# Patient Record
Sex: FEMALE | Race: ASIAN | NOT HISPANIC OR LATINO | URBAN - METROPOLITAN AREA
[De-identification: names, ages, dates, MRNs, and addresses within clinical notes are randomized per-mention and may not be internally consistent; named-entity substitution may affect disease eponyms.]

---

## 2024-10-25 ENCOUNTER — EMERGENCY (EMERGENCY)
Facility: HOSPITAL | Age: 39
LOS: 1 days | Discharge: HOME | End: 2024-10-25
Admitting: EMERGENCY MEDICINE
Payer: COMMERCIAL

## 2024-10-25 VITALS
WEIGHT: 130.07 LBS | HEART RATE: 60 BPM | HEIGHT: 64 IN | OXYGEN SATURATION: 98 % | TEMPERATURE: 99 F | SYSTOLIC BLOOD PRESSURE: 111 MMHG | DIASTOLIC BLOOD PRESSURE: 74 MMHG | RESPIRATION RATE: 18 BRPM

## 2024-10-25 PROCEDURE — 12011 RPR F/E/E/N/L/M 2.5 CM/<: CPT

## 2024-10-25 PROCEDURE — 99284 EMERGENCY DEPT VISIT MOD MDM: CPT | Mod: 25

## 2024-10-25 PROCEDURE — 72125 CT NECK SPINE W/O DYE: CPT | Mod: 26,MC

## 2024-10-25 PROCEDURE — 70450 CT HEAD/BRAIN W/O DYE: CPT | Mod: 26,MC

## 2024-10-25 PROCEDURE — 70486 CT MAXILLOFACIAL W/O DYE: CPT | Mod: 26,MC

## 2024-10-25 NOTE — ED PROVIDER NOTE - NSICDXNOFAMILYHX_GEN_ALL_ED
Family at bedside  Pt had another episode of green watery stool, large amount, covered legs down to mid calf  Pt cleaned up and placed into clean gown       Rudi Norris RN  03/24/21 2012 <-- Click to add NO pertinent Family History
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
## 2024-10-25 NOTE — ED PROVIDER NOTE - PATIENT PORTAL LINK FT
You can access the FollowMyHealth Patient Portal offered by [facility] by registering at the following website: [email or web address]. By joining [facility]’s FollowMyHealth portal, you will also be able to view your health information using other applications (apps) compatible with our system.
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
## 2024-10-25 NOTE — ED PROVIDER NOTE - NSFOLLOWUPINSTRUCTIONS_ED_ALL_ED_FT
Please follow up with your Primary MD in 24-48 hr for wound check or return here for wound check  Return in 7-10 days for suture removal  Keep wound dry and clean for 2 days then you can wash it gently with soap and water  Apply bacitracin topically after cleaning  Seek immediate medical care for any new/worsening signs or symptoms.  Laceration    A laceration is a cut that goes through all of the layers of the skin and into the tissue that is right under the skin. Some lacerations heal on their own. Others need to be closed with skin adhesive strips, skin glue, stitches (sutures), or staples. Proper laceration care minimizes the risk of infection and helps the laceration to heal better.  If non-absorbable stitches or staples have been placed, they must be taken out within the time frame instructed by your healthcare provider.    SEEK IMMEDIATE MEDICAL CARE IF YOU HAVE ANY OF THE FOLLOWING SYMPTOMS: swelling around the wound, worsening pain, drainage from the wound, red streaking going away from your wound, inability to move finger or toe near the laceration, or discoloration of skin near the laceration.  Contusion    A contusion is a deep bruise. Contusions are the result of a blunt injury to tissues and muscle fibers under the skin. The skin overlying the contusion may turn blue, purple, or yellow. Symptoms also include pain and swelling in the injured area.    SEEK IMMEDIATE MEDICAL CARE IF YOU HAVE ANY OF THE FOLLOWING SYMPTOMS: severe pain, numbness, tingling, pain, weakness, or skin color/temperature change in any part of your body distal to the injury.  What is a concussion?  A concussion is a mild brain injury that can cause confusion, memory loss, and headache. Sometimes people pass out (lose consciousness) when they have a concussion, but not always.    A concussion can happen after a person has an injury to the head from being hit or falling.    What are the symptoms of a concussion?  Symptoms that can happen minutes to hours after a concussion include:    ?Memory loss – People sometimes forget what caused their injury, as well as what happened right before and after the injury.    ?Confusion    ?Headache    ?Dizziness or trouble with balance    ?Nausea or vomiting    ?Feeling sleepy    ?Acting cranky, strangely, or out of sorts    Symptoms that can happen hours to days after a concussion include:    ?Trouble walking or talking    ?Memory problems or problems paying attention    ?Trouble sleeping    ?Mood or behavior changes    ?Vision changes    ?Being bothered by noise or light    Will I need tests?  It depends on your injury and symptoms. To check if you have a concussion, your doctor will ask about your symptoms and do an exam. He or she will also ask you questions to check that you are thinking clearly.    If your doctor suspects a serious injury, he or she might order an imaging test of the brain, such as a CT or MRI scan. These tests create pictures of the skull and inside of the brain.    How is a concussion treated?  A concussion does not usually need treatment. Most concussions get better on their own, but it can take time. Some people's symptoms go away within minutes to hours. Other people have symptoms for weeks to months. When symptoms last a long time, doctors call it "postconcussion syndrome."    After your concussion, your doctor might recommend that someone watch you for 12 to 24 hours. This person should watch for symptoms.    To help your brain heal after a concussion, you can:    ?Rest your body – Make sure to get plenty of sleep. Avoid heavy exercise or too much physical activity if it makes you feel worse.    ?Rest your brain – Avoid doing activities that need concentration or a lot of attention if they make you feel worse.    ?Not drink alcohol while you are still having symptoms of concussion    ?Take a pain-relieving medicine, if you have a headache – You can choose one with acetaminophen (sample brand name: Tylenol) or ibuprofen (sample brand names: Advil, Motrin).    When can I play sports or do my usual activities again?  Ask your doctor when you can play sports or do your usual activities again. It will depend on your injury and symptoms, as well as the type of sport you play. Do not go back to playing on the same day as your injury.    It's important to let your brain heal completely after a concussion. Getting another concussion before your brain has healed may lead to serious brain problems.    When should I call the doctor or nurse?  Call the doctor or nurse if any of the following happen after a concussion:    ?You vomit more than 3 times    ?You have a severe headache, or a headache that gets worse    ?You have a seizure    ?You have trouble walking or talking    ?Your vision changes    ?You feel weak or numb in part of your body    ?You lose control over your bladder or bowel    If your doctor suggested that someone watch you after your concussion, this person should call the doctor or nurse if he or she:    ?Can't wake you up    ?Sees any of the symptoms listed above    How can I prevent another concussion?  To help prevent another concussion, you can:    ?Wear a helmet when you ride a bike or motorcycle, or play certain sports    ?Wear a seat belt when you drive or ride in a car    If you have one concussion, it's very important to try to prevent future concussions. Having many concussions might cause long-term brain damage and affect your thinking.
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
## 2024-10-27 DIAGNOSIS — S01.81XA LACERATION WITHOUT FOREIGN BODY OF OTHER PART OF HEAD, INITIAL ENCOUNTER: ICD-10-CM
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
## 2024-11-02 ENCOUNTER — EMERGENCY (EMERGENCY)
Facility: HOSPITAL | Age: 39
LOS: 1 days | Discharge: HOME | End: 2024-11-02
Admitting: EMERGENCY MEDICINE
Payer: MEDICAID

## 2024-11-02 VITALS
TEMPERATURE: 98 F | HEIGHT: 64 IN | OXYGEN SATURATION: 99 % | SYSTOLIC BLOOD PRESSURE: 115 MMHG | DIASTOLIC BLOOD PRESSURE: 74 MMHG | RESPIRATION RATE: 19 BRPM | HEART RATE: 71 BPM

## 2024-11-02 DIAGNOSIS — S01.81XD LACERATION WITHOUT FOREIGN BODY OF OTHER PART OF HEAD, SUBSEQUENT ENCOUNTER: ICD-10-CM

## 2024-11-02 PROBLEM — Z78.9 OTHER SPECIFIED HEALTH STATUS: Chronic | Status: ACTIVE | Noted: 2024-10-25

## 2024-11-02 PROCEDURE — L9995: CPT

## 2024-11-02 NOTE — ED ADULT NURSE NOTE - NSFALLRISKINTERV_ED_ALL_ED

## 2024-11-02 NOTE — ED PROVIDER NOTE - NSFOLLOWUPINSTRUCTIONS_ED_ALL_ED_FT
Keep wound clean and dry  Return for any concerns.     PLEASE FOLLOW-UP WITH YOUR PRIMARY CARE DOCTOR IN 1-2 DAYS FOR FURTHER EVALUATION.      PLEASE TAKE ALL PAPERWORK FROM TODAY'S VISIT TO YOUR PRIMARY DOCTOR.     IF YOU DO NOT HAVE A PRIMARY CARE DOCTOR PLEASE REFER TO THE OFFICE/CLINIC INFORMATION GIVEN BELOW:    If you do not have a doctor, you can call our referral line to find a doctor that matches your insurance; the number is [phone].     You can also follow up with clinics listed below, if you do not have a doctor:  [address] [address]  To make an appointment, call [phone]    [facility], [place], operated by [facility]  Address: [address] [place] [address]  Appointment Center: [phone] ([phone])     To access your record on the patient portal [facility], please visit:  [email or web address]  If you are having difficulties setting this up, call [phone] and someone can assist you over the phone.     PLEASE RETURN TO THE ER IMMEDIATELY OR CALL [phone] ANY HIGH FEVER, CHEST PAIN, TROUBLE BREATHING, VOMITING, SEVERE PAIN, OR ANY OTHER CONCERNS

## 2024-11-02 NOTE — ED PROVIDER NOTE - PATIENT PORTAL LINK FT
You can access the FollowMyHealth Patient Portal offered by [facility] by registering at the following website: [email or web address]. By joining [employer]’s FollowMyHealth portal, you will also be able to view your health information using other applications (apps) compatible with our system.

## 2024-11-02 NOTE — ED PROVIDER NOTE - OBJECTIVE STATEMENT
38-year-old female here for suture removal to the jaw, sutures placed 1 week ago.  Patient has no complaints.  Denies pain, drainage, fever, swelling.

## 2024-11-02 NOTE — ED PROVIDER NOTE - PHYSICAL EXAMINATION
CONSTITUTIONAL: Well-appearing; well-nourished; in no apparent distress.   	HEAD: Normocephalic; atraumatic.   	sutures clean/dry/intact to jaw, no surrounding erythema/drainage/swelling or tenderness.   	NEURO: A & O x 3; face symmetric; grossly unremarkable.   PSYCHOLOGICAL: The patient’s mood and manner are appropriate.